# Patient Record
Sex: FEMALE | Race: WHITE
[De-identification: names, ages, dates, MRNs, and addresses within clinical notes are randomized per-mention and may not be internally consistent; named-entity substitution may affect disease eponyms.]

---
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
## 2022-06-02 ENCOUNTER — HOSPITAL ENCOUNTER (EMERGENCY)
Facility: MEDICAL CENTER | Age: 22
End: 2022-06-02
Attending: EMERGENCY MEDICINE
Payer: MEDICAID

## 2022-06-02 VITALS
DIASTOLIC BLOOD PRESSURE: 63 MMHG | BODY MASS INDEX: 27.66 KG/M2 | OXYGEN SATURATION: 100 % | TEMPERATURE: 98.9 F | HEIGHT: 64 IN | SYSTOLIC BLOOD PRESSURE: 127 MMHG | HEART RATE: 59 BPM | WEIGHT: 162.04 LBS | RESPIRATION RATE: 18 BRPM

## 2022-06-02 DIAGNOSIS — N39.0 ACUTE UTI: ICD-10-CM

## 2022-06-02 LAB
APPEARANCE UR: CLEAR
BACTERIA #/AREA URNS HPF: ABNORMAL /HPF
BILIRUB UR QL STRIP.AUTO: NEGATIVE
COLOR UR: YELLOW
EPI CELLS #/AREA URNS HPF: ABNORMAL /HPF
GLUCOSE UR STRIP.AUTO-MCNC: NEGATIVE MG/DL
HCG UR QL: NEGATIVE
KETONES UR STRIP.AUTO-MCNC: ABNORMAL MG/DL
LEUKOCYTE ESTERASE UR QL STRIP.AUTO: ABNORMAL
MICRO URNS: ABNORMAL
MUCOUS THREADS #/AREA URNS HPF: ABNORMAL /HPF
NITRITE UR QL STRIP.AUTO: POSITIVE
PH UR STRIP.AUTO: 6 [PH] (ref 5–8)
PROT UR QL STRIP: NEGATIVE MG/DL
RBC # URNS HPF: ABNORMAL /HPF
RBC UR QL AUTO: ABNORMAL
SP GR UR STRIP.AUTO: >=1.03
WBC #/AREA URNS HPF: ABNORMAL /HPF

## 2022-06-02 PROCEDURE — 87077 CULTURE AEROBIC IDENTIFY: CPT

## 2022-06-02 PROCEDURE — 81025 URINE PREGNANCY TEST: CPT

## 2022-06-02 PROCEDURE — 87086 URINE CULTURE/COLONY COUNT: CPT

## 2022-06-02 PROCEDURE — 87186 SC STD MICRODIL/AGAR DIL: CPT

## 2022-06-02 PROCEDURE — 99283 EMERGENCY DEPT VISIT LOW MDM: CPT

## 2022-06-02 PROCEDURE — 81001 URINALYSIS AUTO W/SCOPE: CPT

## 2022-06-02 RX ORDER — NITROFURANTOIN 25; 75 MG/1; MG/1
100 CAPSULE ORAL 2 TIMES DAILY
Qty: 14 CAPSULE | Refills: 0 | Status: SHIPPED | OUTPATIENT
Start: 2022-06-02 | End: 2022-06-09

## 2022-06-02 RX ORDER — PHENAZOPYRIDINE HYDROCHLORIDE 200 MG/1
200 TABLET, FILM COATED ORAL 3 TIMES DAILY PRN
Qty: 6 TABLET | Refills: 0 | Status: SHIPPED | OUTPATIENT
Start: 2022-06-02 | End: 2023-04-29

## 2022-06-02 NOTE — DISCHARGE INSTRUCTIONS
Take cranberry pills daily as a preventative and continue with follow up with your primary care and obgyn.

## 2022-06-02 NOTE — ED NOTES
Pt cleared for d/c  dischg instructions given to pt  Verbally understands  Aware that Rx's Macrobid and pyridium were sent to listed pharmacy   Pt instructed to  and take as directed  D/c'ed to home in NAD

## 2022-06-02 NOTE — ED PROVIDER NOTES
ED Provider Note    Scribed for Stacey Brandt M.D. by Shayla Dave. 6/2/2022  1:20 PM    Primary care provider: Amrit Jimenez M.D.  Means of arrival: Walk-in  History obtained from: Patient  History limited by: None noted    CHIEF COMPLAINT  Chief Complaint   Patient presents with    Painful Urination       HPI  Jayda Engel is a 21 y.o. female who presents to the Emergency Department with intermittent dysuria for the last couple months. Patient states she has been seen by her primary care physician an was told to complete a kidney ultrasound out patient. She notes that she has been taking antibiotics with little relief of her symptoms. She endorses associated lower abdominal pain, but denies any fevers, back pain, vomiting or vaginal discharge. The patient is currently on her menstrual period. She denies any history of diabetes. The patient reports testing positive for COVID last year.     REVIEW OF SYSTEMS  HEENT:  No ear pain, congestion, or sore throat   EYES: no discharge, redness, or vision changes  CARDIAC: no chest pain, no palpitations    PULMONARY: no dyspnea, cough, or congestion   GI: lower abdominal pain no vomiting or diarrhea  : dysuria no back pain,  vaginal discharge, or hematuria   Neuro: no weakness, numbness, aphasia, or headache  Musculoskeletal: no swelling, deformity, pain, or joint swelling  Endocrine: no fevers, sweating, or weight loss   SKIN: no rash, erythema, or contusions     See history of present illness. All other systems are negative. C.    PAST MEDICAL HISTORY   None noted    SURGICAL HISTORY  patient denies any surgical history    SOCIAL HISTORY    None noted    FAMILY HISTORY  None noted    CURRENT MEDICATIONS  Home Medications       Reviewed by Tara Hameed R.N. (Registered Nurse) on 06/02/22 at 1332  Med List Status: Not Addressed     Medication Last Dose Status        Patient Abel Taking any Medications                           ALLERGIES  No Known  "Allergies    PHYSICAL EXAM  VITAL SIGNS: /69   Pulse 78   Temp 36.5 °C (97.7 °F) (Temporal)   Resp 18   Ht 1.626 m (5' 4\")   Wt 73.5 kg (162 lb 0.6 oz)   SpO2 98%   BMI 27.81 kg/m²     Constitutional: Well developed, Well nourished, No acute distress, Non-toxic appearance.   HEENT: Normocephalic, Atraumatic,  external ears normal, pharynx pink,  Mucous  Membranes moist, No rhinorrhea or mucosal edema  Eyes: PERRL, EOMI, Conjunctiva normal, No discharge.   Neck: Normal range of motion, No tenderness, Supple, No stridor.   Lymphatic: No lymphadenopathy    Cardiovascular: Regular Rate and Rhythm, No murmurs,  rubs, or gallops.   Thorax & Lungs: Lungs clear to auscultation bilaterally, No respiratory distress, No wheezes, rhales or rhonchi, No chest wall tenderness.   Abdomen: Bowel sounds normal, Soft, Mild suprapubic tenderness, non distended,  No pulsatile masses., no rebound guarding or peritoneal signs.   Skin: Warm, Dry, No erythema, No rash,   Back:  No CVA tenderness,  No spinal tenderness, bony crepitance, step offs, or instability.   Neurologic: Alert & oriented clear speech no focal deficits  Extremities: Equal, intact distal pulses, No cyanosis, clubbing or edema,  No tenderness.   Musculoskeletal: Good range of motion in all major joints. No tenderness to palpation or major deformities noted.       DIAGNOSTIC STUDIES / PROCEDURES    LABS  Results for orders placed or performed during the hospital encounter of 06/02/22   URINALYSIS,CULTURE IF INDICATED    Specimen: Urine, Clean Catch   Result Value Ref Range    Color Yellow     Character Clear     Specific Gravity >=1.030 <1.035    Ph 6.0 5.0 - 8.0    Glucose Negative Negative mg/dL    Ketones Trace (A) Negative mg/dL    Protein Negative Negative mg/dL    Bilirubin Negative Negative    Nitrite Positive (A) Negative    Leukocyte Esterase Small (A) Negative    Occult Blood Trace (A) Negative    Micro Urine Req Microscopic    BETA-HCG QUALITATIVE " URINE   Result Value Ref Range    Beta-Hcg Urine Negative Negative   URINE MICROSCOPIC (W/UA)   Result Value Ref Range    WBC 20-50 (A) /hpf    RBC 2-5 (A) /hpf    Bacteria Moderate (A) None /hpf    Epithelial Cells Rare Few /hpf    Mucous Threads Moderate /hpf   URINE CULTURE(NEW)    Specimen: Urine   Result Value Ref Range    Significant Indicator NEG     Source UR     Site -     Culture Result -      All labs reviewed by me.    COURSE & MEDICAL DECISION MAKING  Nursing notes, VS, PMSFHx reviewed in chart.    1:20 PM Patient seen and examined at bedside. Ordered Urine Microscopic, Urinalysis Culture, Beta-HCG Qualitative Urine and Urine Culture to evaluate her symptoms. The patient was encouraged to take Tylenol as needed for pain. The differential diagnoses include but are not limited to: UTI. Additionally, I encouraged the patient to follow up outpatient regarding her urinary symptoms. She was informed of the plan for discharge home with PYRIDIUM and MACROBID. She was advised to return for any high fevers, back pain, vomiting or worsening symptoms. Patient verbalizes understanding and agreement to this plan of care.     The patient will return for new or worsening symptoms and is stable at the time of discharge.    The patient is referred to a primary physician for blood pressure management, diabetic screening, and for all other preventative health concerns.    DISPOSITION:  Patient will be discharged home in stable condition.    FOLLOW UP:  Amrit Jimenez M.D.  [address]  [address]  [facility] [address]  [phone]    Call in 1 day  for recheck      OUTPATIENT MEDICATIONS:  Discharge Medication List as of 6/2/2022  1:40 PM        START taking these medications    Details   nitrofurantoin (MACROBID) 100 MG Cap Take 1 Capsule by mouth 2 times a day for 7 days., Disp-14 Capsule, R-0, Normal      phenazopyridine (PYRIDIUM) 200 MG Tab Take 1 Tablet by mouth 3 times a day as needed for Mild Pain., Disp-6 Tablet,  R-0, Normal             FINAL IMPRESSION  1. Acute UTI          Shayla RASHEED (Scribe), am scribing for, and in the presence of, Stacey Brandt M.D..    Electronically signed by: Shayla Dave (Scribe), 6/2/2022    Stacey RASHEED M.D. personally performed the services described in this documentation, as scribed by Shayla Dave in my presence, and it is both accurate and complete.    The note accurately reflects work and decisions made by me.  Stacey Brandt M.D.  6/2/2022  3:46 PM

## 2022-06-02 NOTE — ED TRIAGE NOTES
Pt amb to triage c/o painful urination >1mo; pt states pain to lower pelvis region, worsens with urination.

## 2022-06-02 NOTE — LETTER
6/4/2022               [place] [address]        Dear Jayda (MR#[id number])    This letter is sent in regards to your recent visit to the [facility] Emergency Department on 6/2/2022. During the visit, tests were performed to assist the physician in your medical diagnosis. A review of your tests requires that we notify you of the following:    Your urine culture was POSITIVE for a bacteria called Escherichia coli. The antibiotic prescribed for you (nitrofurantoin) should be active to treat this bacteria. However, you may STOP taking your antibiotic after 5 days as 5 days is sufficient to treat your infection.     Please feel free to contact me at the number below if you have any questions or concerns. Thank you for your cooperation in the matter.    Sincerely,  ED Culture Follow-Up Staff  Shirley Laguerre, PharmD,BCPS  PGY2 Infectious Diseases Pharmacy Resident      [facility] Emergency Department  [address] [place] [id number] [phone] (Shirley's Phone Number)  [phone] (ED Culture Line)

## 2022-06-04 LAB
BACTERIA UR CULT: ABNORMAL
SIGNIFICANT IND 70042: ABNORMAL
SITE SITE: ABNORMAL
SOURCE SOURCE: ABNORMAL

## 2022-06-04 NOTE — ED NOTES
"ED Positive Culture Follow-up/Notification Note:    Date: 6/4/2022     Patient seen in the ED on 6/2/2022 for dysuria.   1. Acute UTI       Discharge Medication List as of 6/2/2022  1:40 PM      START taking these medications    Details   nitrofurantoin (MACROBID) 100 MG Cap Take 1 Capsule by mouth 2 times a day for 7 days., Disp-14 Capsule, R-0, Normal      phenazopyridine (PYRIDIUM) 200 MG Tab Take 1 Tablet by mouth 3 times a day as needed for Mild Pain., Disp-6 Tablet, R-0, Normal             Allergies: Patient has no known allergies.     Vitals:    06/02/22 1154 06/02/22 1339   BP: 122/69 127/63   Pulse: 78 (!) 59   Resp: 18 18   Temp: 36.5 °C (97.7 °F) 37.2 °C (98.9 °F)   TempSrc: Temporal Temporal   SpO2: 98% 100%   Weight: 73.5 kg (162 lb 0.6 oz)    Height: 1.626 m (5' 4\")        Final cultures:   Results     Procedure Component Value Units Date/Time    URINE CULTURE(NEW) [[id number]]  (Abnormal)  (Susceptibility) Collected: 06/02/22 1225    Order Status: Completed Specimen: Urine Updated: 06/04/22 0848     Significant Indicator POS     Source UR     Site -     Culture Result -      Escherichia coli  >100,000 cfu/mL  Presumptive identification        Streptococcus agalactiae (Group B)  10-50,000 cfu/mL      Narrative:      Indication for culture:->Patient WITHOUT an indwelling Baltazar  catheter in place with new onset of Dysuria, Frequency,  Urgency, and/or Suprapubic pain    Susceptibility     Escherichia coli (1)     Antibiotic Interpretation Microscan   Method Status    Amikacin  [*]  Sensitive <=16 mcg/mL GAYATRI Final    Ampicillin Resistant >16 mcg/mL GAYATRI Final    Amoxicillin/CA  [*]  Resistant >16/8 mcg/mL GAYATRI Final    Aztreonam  [*]  Sensitive <=4 mcg/mL GAYATRI Final    Ceftriaxone Sensitive <=1 mcg/mL GAYATRI Final    Ceftazidime  [*]  Sensitive <=1 mcg/mL GAYATRI Final    Cefazolin Sensitive <=2 mcg/mL GAYATRI Final     Breakpoints when Cefazolin is used for therapy of infections  other than uncomplicated UTIs due to " Enterobacterales are as  follows:  GAYATRI and Interpretation:  <=2 S  4 I  >=8 R         Ciprofloxacin Sensitive <=0.25 mcg/mL GAYATRI Final    Cefepime Sensitive <=2 mcg/mL GAYATRI Final    Cefuroxime Sensitive <=4 mcg/mL GAYATRI Final    Ampicillin/sulbactam Resistant >16/8 mcg/mL GAYATRI Final    Ertapenem  [*]  Sensitive <=0.5 mcg/mL GAYATRI Final    Tobramycin Sensitive <=2 mcg/mL GAYATRI Final    Nitrofurantoin Sensitive <=32 mcg/mL GAYATRI Final    Gentamicin Sensitive <=2 mcg/mL GAYATRI Final    Levofloxacin Sensitive <=0.5 mcg/mL GAYATRI Final    Meropenem  [*]  Sensitive <=1 mcg/mL GAYATRI Final    Minocycline Sensitive <=4 mcg/mL GAYATRI Final    Pip/Tazobactam Sensitive <=8 mcg/mL GAYATRI Final    Trimeth/Sulfa Resistant >2/38 mcg/mL GAYATRI Final    Tetracycline  [*]  Sensitive <=4 mcg/mL GAYATRI Final    Tigecycline Sensitive <=2 mcg/mL GAYATRI Final           [*]  Suppressed Antibiotic                 URINALYSIS,CULTURE IF INDICATED [[id number]]  (Abnormal) Collected: 06/02/22 1225    Order Status: Completed Specimen: Urine, Clean Catch Updated: 06/02/22 1311     Color Yellow     Character Clear     Specific Gravity >=1.030     Ph 6.0     Glucose Negative mg/dL      Ketones Trace mg/dL      Protein Negative mg/dL      Bilirubin Negative     Nitrite Positive     Leukocyte Esterase Small     Occult Blood Trace     Micro Urine Req Microscopic    Narrative:      Indication for culture:->Patient WITHOUT an indwelling Baltazar  catheter in place with new onset of Dysuria, Frequency,  Urgency, and/or Suprapubic pain    URINALYSIS CULTURE, IF INDICATED [[id number]]     Order Status: Canceled Specimen: Urine           Plan:   Appropriate antibiotic therapy prescribed. GBS in urine likely colonizer since part of the normal vaginal carmen. Suspect that ecoli is the urinary pathogen. However, patient only needs to take 5 days of macrobid for acute cystitis instead of the 7 days as prescribed. Attempted to call patient to discuss symptoms and cultures, cellphone mailbox was full  and home phone was not in service.  Sent letter to patient to notify of positive culture result and encourage compliance with prescribed antibiotics. Also noted to stop antibiotics after 5 days of therapy.    Shirley Laguerre, PharmD, BCPS   PGY2 Infectious Diseases Pharmacy Resident

## 2023-03-31 ENCOUNTER — APPOINTMENT (OUTPATIENT)
Dept: RADIOLOGY | Facility: MEDICAL CENTER | Age: 23
End: 2023-03-31
Attending: EMERGENCY MEDICINE
Payer: MEDICAID

## 2023-03-31 ENCOUNTER — HOSPITAL ENCOUNTER (EMERGENCY)
Facility: MEDICAL CENTER | Age: 23
End: 2023-03-31
Attending: EMERGENCY MEDICINE
Payer: MEDICAID

## 2023-03-31 VITALS
WEIGHT: 165.57 LBS | SYSTOLIC BLOOD PRESSURE: 118 MMHG | HEART RATE: 71 BPM | BODY MASS INDEX: 28.27 KG/M2 | RESPIRATION RATE: 18 BRPM | DIASTOLIC BLOOD PRESSURE: 72 MMHG | TEMPERATURE: 98.2 F | HEIGHT: 64 IN | OXYGEN SATURATION: 96 %

## 2023-03-31 DIAGNOSIS — O46.8X1 SUBCHORIONIC HEMATOMA IN FIRST TRIMESTER, SINGLE OR UNSPECIFIED FETUS: ICD-10-CM

## 2023-03-31 DIAGNOSIS — R10.9 ABDOMINAL PAIN DURING PREGNANCY IN FIRST TRIMESTER: ICD-10-CM

## 2023-03-31 DIAGNOSIS — O26.891 ABDOMINAL PAIN DURING PREGNANCY IN FIRST TRIMESTER: ICD-10-CM

## 2023-03-31 DIAGNOSIS — O41.8X10 SUBCHORIONIC HEMATOMA IN FIRST TRIMESTER, SINGLE OR UNSPECIFIED FETUS: ICD-10-CM

## 2023-03-31 LAB
ALBUMIN SERPL BCP-MCNC: 4.1 G/DL (ref 3.2–4.9)
ALBUMIN/GLOB SERPL: 1.6 G/DL
ALP SERPL-CCNC: 55 U/L (ref 30–99)
ALT SERPL-CCNC: 9 U/L (ref 2–50)
ANION GAP SERPL CALC-SCNC: 11 MMOL/L (ref 7–16)
APPEARANCE UR: CLEAR
AST SERPL-CCNC: 13 U/L (ref 12–45)
B-HCG SERPL-ACNC: ABNORMAL MIU/ML (ref 0–10)
BASOPHILS # BLD AUTO: 0.2 % (ref 0–1.8)
BASOPHILS # BLD: 0.02 K/UL (ref 0–0.12)
BILIRUB SERPL-MCNC: 0.2 MG/DL (ref 0.1–1.5)
BILIRUB UR QL STRIP.AUTO: NEGATIVE
BUN SERPL-MCNC: 11 MG/DL (ref 8–22)
CALCIUM ALBUM COR SERPL-MCNC: 9.1 MG/DL (ref 8.5–10.5)
CALCIUM SERPL-MCNC: 9.2 MG/DL (ref 8.4–10.2)
CHLORIDE SERPL-SCNC: 103 MMOL/L (ref 96–112)
CO2 SERPL-SCNC: 23 MMOL/L (ref 20–33)
COLOR UR: YELLOW
CREAT SERPL-MCNC: 0.64 MG/DL (ref 0.5–1.4)
EOSINOPHIL # BLD AUTO: 0.1 K/UL (ref 0–0.51)
EOSINOPHIL NFR BLD: 1.1 % (ref 0–6.9)
ERYTHROCYTE [DISTWIDTH] IN BLOOD BY AUTOMATED COUNT: 39.8 FL (ref 35.9–50)
GFR SERPLBLD CREATININE-BSD FMLA CKD-EPI: 129 ML/MIN/1.73 M 2
GLOBULIN SER CALC-MCNC: 2.5 G/DL (ref 1.9–3.5)
GLUCOSE SERPL-MCNC: 92 MG/DL (ref 65–99)
GLUCOSE UR STRIP.AUTO-MCNC: NEGATIVE MG/DL
HCT VFR BLD AUTO: 36.1 % (ref 37–47)
HGB BLD-MCNC: 12.3 G/DL (ref 12–16)
IMM GRANULOCYTES # BLD AUTO: 0.02 K/UL (ref 0–0.11)
IMM GRANULOCYTES NFR BLD AUTO: 0.2 % (ref 0–0.9)
KETONES UR STRIP.AUTO-MCNC: ABNORMAL MG/DL
LEUKOCYTE ESTERASE UR QL STRIP.AUTO: NEGATIVE
LIPASE SERPL-CCNC: 31 U/L (ref 7–58)
LYMPHOCYTES # BLD AUTO: 2.78 K/UL (ref 1–4.8)
LYMPHOCYTES NFR BLD: 29.7 % (ref 22–41)
MCH RBC QN AUTO: 30.3 PG (ref 27–33)
MCHC RBC AUTO-ENTMCNC: 34.1 G/DL (ref 33.6–35)
MCV RBC AUTO: 88.9 FL (ref 81.4–97.8)
MICRO URNS: ABNORMAL
MONOCYTES # BLD AUTO: 0.55 K/UL (ref 0–0.85)
MONOCYTES NFR BLD AUTO: 5.9 % (ref 0–13.4)
NEUTROPHILS # BLD AUTO: 5.9 K/UL (ref 2–7.15)
NEUTROPHILS NFR BLD: 62.9 % (ref 44–72)
NITRITE UR QL STRIP.AUTO: NEGATIVE
NRBC # BLD AUTO: 0 K/UL
NRBC BLD-RTO: 0 /100 WBC
NUMBER OF RH DOSES IND 8505RD: NORMAL
PH UR STRIP.AUTO: 6 [PH] (ref 5–8)
PLATELET # BLD AUTO: 288 K/UL (ref 164–446)
PMV BLD AUTO: 9.5 FL (ref 9–12.9)
POTASSIUM SERPL-SCNC: 3.8 MMOL/L (ref 3.6–5.5)
PROT SERPL-MCNC: 6.6 G/DL (ref 6–8.2)
PROT UR QL STRIP: NEGATIVE MG/DL
RBC # BLD AUTO: 4.06 M/UL (ref 4.2–5.4)
RBC UR QL AUTO: NEGATIVE
RH BLD: NORMAL
SODIUM SERPL-SCNC: 137 MMOL/L (ref 135–145)
SP GR UR STRIP.AUTO: >=1.03
WBC # BLD AUTO: 9.4 K/UL (ref 4.8–10.8)

## 2023-03-31 PROCEDURE — 76801 OB US < 14 WKS SINGLE FETUS: CPT

## 2023-03-31 PROCEDURE — 36415 COLL VENOUS BLD VENIPUNCTURE: CPT

## 2023-03-31 PROCEDURE — 81003 URINALYSIS AUTO W/O SCOPE: CPT

## 2023-03-31 PROCEDURE — 83690 ASSAY OF LIPASE: CPT

## 2023-03-31 PROCEDURE — 86901 BLOOD TYPING SEROLOGIC RH(D): CPT

## 2023-03-31 PROCEDURE — 99284 EMERGENCY DEPT VISIT MOD MDM: CPT

## 2023-03-31 PROCEDURE — 85025 COMPLETE CBC W/AUTO DIFF WBC: CPT

## 2023-03-31 PROCEDURE — 84702 CHORIONIC GONADOTROPIN TEST: CPT

## 2023-03-31 PROCEDURE — 80053 COMPREHEN METABOLIC PANEL: CPT

## 2023-03-31 ASSESSMENT — PAIN DESCRIPTION - PAIN TYPE: TYPE: ACUTE PAIN

## 2023-03-31 NOTE — Clinical Note
Jayda Engel was seen and treated in our emergency department on 3/31/2023.  She may return to work on 04/01/2023.  Recommend light duty, no lifting over 10 pounds for the next 3 weeks     If you have any questions or concerns, please don't hesitate to call.      Crispin Cole M.D.

## 2023-04-01 NOTE — ED NOTES
Pt is 5 weeks pregnant. Reports 7/10 pain in the lower right of her abdomen. Pt reports they have seen their OBGYN and confirmed the pregnancy was in the uterus.  Pt states they have had this pain previous to pregnancy without being able to identify the cause.  Pushing and pulling seem to make the pain worse according to pt.  Pt CC is lower right abdominal pain. Pt stated they mostly want to make sure everything is ok with the pregnancy.  Aox4, pt ambulated to the room, speaking in full sentences.

## 2023-04-01 NOTE — ED PROVIDER NOTES
"ED Provider Note    Primary care provider: Amrit Jimenez M.D.    CHIEF COMPLAINT  Chief Complaint   Patient presents with    Abdominal Pain     Low abd pain for 2 days, she is 5 weeks pregnant and was advised by PCP to come to the ED. She was recently treated for a UTI and is still on the antibiotics.       HPI  Jayda Engel is a 22 y.o. female G1, P0 EGA 5 weeks by US who presents to the Emergency Department suprapubic/right-sided abdominal pain.  The patient states she has had intermittent pain in this location preceding her pregnancy.  Over the past day or so it has become more persistent, no modifying factors.  She had an ultrasound in the OB clinic 2 weeks ago that did visualize an intrauterine gestational sac.  She called them as she was continued to have some pain in this location and they recommend she come to the ER to be checked out.    She denies any fevers, chills, nausea, vomiting, vaginal discharge.  She did have a UTI at her last OB appointment.  No prior history of abdominal surgeries.  REVIEW OF SYSTEMS  See HPI.     PAST MEDICAL HISTORY       SURGICAL HISTORY  patient denies any surgical history    SOCIAL HISTORY      Social History     Substance and Sexual Activity   Drug Use Not on file       FAMILY HISTORY  No family history on file.    CURRENT MEDICATIONS  Reviewed.  See Encounter Summary.     ALLERGIES  No Known Allergies    PHYSICAL EXAM  VITAL SIGNS: /72   Pulse 71   Temp 36.8 °C (98.2 °F) (Temporal)   Resp 18   Ht 1.626 m (5' 4\")   Wt 75.1 kg (165 lb 9.1 oz)   LMP 02/11/2023 (Exact Date)   SpO2 96%   BMI 28.42 kg/m²   Constitutional: Awake, alert in no apparent distress.  HENT: Normocephalic, Bilateral external ears normal. Nose normal.   Eyes: Conjunctiva normal, non-icteric, EOMI.    Thorax & Lungs: Easy unlabored respirations, Clear to ascultation bilaterally.  Cardiovascular: Regular rate, Regular rhythm, No murmurs, rubs or gallops. Bilateral pulses symmetrical. "   Abdomen:  Soft, nontender, nondistended, normal active bowel sounds.  Negative psoas, negative obturator.  :    Skin: Visualized skin is  Dry, No erythema, No rash.   Musculoskeletal:   No cyanosis, clubbing or edema. No leg asymmetry.   Neurologic: Alert, Grossly non-focal.   Psychiatric: Normal affect, Normal mood  Lymphatic:      RADIOLOGY  US-OB 1ST TRIMESTER WITH TRANSVAGINAL (COMBO)   Final Result      1.  Single living intrauterine pregnancy at 6 weeks, 3 days estimated gestational age.      2.  Small subchorionic/implantational hemorrhage measuring 3.7 x 1.1 x 2.4 cm      3.  1.5 cm right adnexal cyst which is probably a corpus luteum cyst               COURSE & MEDICAL DECISION MAKING  Pertinent Labs & Imaging studies reviewed. (See chart for details)    Differential diagnoses include but are not limited to: Ectopic pregnancy, corpus luteal cyst, UTI, abdominal pain NOS, do not suspect appendicitis at this time.    5:46 PM - Nursing notes reviewed, patient seen and examined at bedside.    Decision Making:  This is a pleasant 22 y.o. year old female who presents with right lower quadrant pain, the pain is not over McBurney's point, its more in the pubic region, just right of midline.  Presentation today is most consistent with a corpus luteal cyst.  Interestingly, the patient has had intermittent pain there even prior to pregnancy, etiology of that type of pain not entirely clear, as possibly she was having intermittent ovarian cyst causing similar type of pain.  She is Rh+, no indication for RhoGAM.  She does have a viable IUP seen on transvaginal ultrasound today.  She also has a small subchorionic hemorrhage.    Remainder of labs reassuring, no leukocytosis or leftward shift.,  Do not suspect acute appendicitis today.    At this time reassurance provided, recommend pelvic rest, Tylenol as needed for any aches and pains.  She should return for soaking more than 4 pads in 4 hours, severe abdominal pain,  intractable vomiting or any other concern.        Discharge Medications:  Discharge Medication List as of 3/31/2023  7:42 PM          The patient was discharged home (see d/c instructions) was told to return immediately for any signs or symptoms listed, or any worsening at all.  The patient verbally agreed to the discharge precautions and follow-up plan which is documented in EPIC.        FINAL IMPRESSION  1. Abdominal pain during pregnancy in first trimester    2. Subchorionic hematoma in first trimester, single or unspecified fetus

## 2023-04-01 NOTE — ED NOTES
Pt. Verbalizes understanding of discharge instructions. Accompanied to lobby with sibling. Pt. Alert/awake in NAD.   All questions answered and understood. Given report as well for work.

## 2023-04-01 NOTE — ED TRIAGE NOTES
Chief Complaint   Patient presents with    Abdominal Pain     Low abd pain for 2 days, she is 5 weeks pregnant and was advised by PCP to come to the ED. She was recently treated for a UTI and is still on the antibiotics.    1st pregnancy

## 2023-04-29 ENCOUNTER — HOSPITAL ENCOUNTER (EMERGENCY)
Facility: MEDICAL CENTER | Age: 23
End: 2023-04-29
Attending: EMERGENCY MEDICINE
Payer: MEDICAID

## 2023-04-29 ENCOUNTER — APPOINTMENT (OUTPATIENT)
Dept: RADIOLOGY | Facility: MEDICAL CENTER | Age: 23
End: 2023-04-29
Attending: EMERGENCY MEDICINE
Payer: MEDICAID

## 2023-04-29 VITALS
OXYGEN SATURATION: 98 % | HEART RATE: 77 BPM | RESPIRATION RATE: 18 BRPM | SYSTOLIC BLOOD PRESSURE: 121 MMHG | BODY MASS INDEX: 28.38 KG/M2 | DIASTOLIC BLOOD PRESSURE: 63 MMHG | TEMPERATURE: 98.2 F | WEIGHT: 165.34 LBS

## 2023-04-29 DIAGNOSIS — O20.0 THREATENED MISCARRIAGE: ICD-10-CM

## 2023-04-29 DIAGNOSIS — N39.0 ACUTE UTI: ICD-10-CM

## 2023-04-29 LAB
APPEARANCE UR: ABNORMAL
B-HCG SERPL-ACNC: ABNORMAL MIU/ML (ref 0–10)
BACTERIA #/AREA URNS HPF: ABNORMAL /HPF
BILIRUB UR QL STRIP.AUTO: NEGATIVE
COLOR UR: YELLOW
EPI CELLS #/AREA URNS HPF: ABNORMAL /HPF
GLUCOSE UR STRIP.AUTO-MCNC: NEGATIVE MG/DL
KETONES UR STRIP.AUTO-MCNC: ABNORMAL MG/DL
LEUKOCYTE ESTERASE UR QL STRIP.AUTO: NEGATIVE
MICRO URNS: ABNORMAL
MUCOUS THREADS #/AREA URNS HPF: ABNORMAL /HPF
NITRITE UR QL STRIP.AUTO: NEGATIVE
PH UR STRIP.AUTO: 6 [PH] (ref 5–8)
PROT UR QL STRIP: NEGATIVE MG/DL
RBC # URNS HPF: ABNORMAL /HPF
RBC UR QL AUTO: ABNORMAL
SP GR UR STRIP.AUTO: 1.02
UNIDENT CRYS URNS QL MICRO: ABNORMAL /HPF
WBC #/AREA URNS HPF: ABNORMAL /HPF

## 2023-04-29 PROCEDURE — 36415 COLL VENOUS BLD VENIPUNCTURE: CPT

## 2023-04-29 PROCEDURE — 84702 CHORIONIC GONADOTROPIN TEST: CPT

## 2023-04-29 PROCEDURE — 700102 HCHG RX REV CODE 250 W/ 637 OVERRIDE(OP): Performed by: EMERGENCY MEDICINE

## 2023-04-29 PROCEDURE — 81001 URINALYSIS AUTO W/SCOPE: CPT

## 2023-04-29 PROCEDURE — 99284 EMERGENCY DEPT VISIT MOD MDM: CPT

## 2023-04-29 PROCEDURE — 76801 OB US < 14 WKS SINGLE FETUS: CPT

## 2023-04-29 PROCEDURE — A9270 NON-COVERED ITEM OR SERVICE: HCPCS | Performed by: EMERGENCY MEDICINE

## 2023-04-29 RX ORDER — CEPHALEXIN 250 MG/1
500 CAPSULE ORAL ONCE
Status: COMPLETED | OUTPATIENT
Start: 2023-04-29 | End: 2023-04-29

## 2023-04-29 RX ORDER — AMOXICILLIN 500 MG/1
500 CAPSULE ORAL 2 TIMES DAILY
COMMUNITY

## 2023-04-29 RX ORDER — CEPHALEXIN 500 MG/1
500 CAPSULE ORAL 3 TIMES DAILY
Qty: 21 CAPSULE | Refills: 0 | Status: SHIPPED | OUTPATIENT
Start: 2023-04-29 | End: 2023-05-06

## 2023-04-29 RX ADMIN — CEPHALEXIN 500 MG: 250 CAPSULE ORAL at 18:55

## 2023-04-29 ASSESSMENT — FIBROSIS 4 INDEX: FIB4 SCORE: 0.3

## 2023-04-29 NOTE — ED NOTES
Med rec completed per patient at bedside, with RX bottle for amoxicillin provided by patient (reviewed and returned).  Allergies reviewed with patient.  Patient's preferred pharmacy: [facility] on [address].    Patient is on a 7 day course of amoxicillin prescribed on 4/21/2023. Patient states 4 doses remaining in course.  Patient is on a 7 day course of Monistat-7 which she states that she started on 4/23/2023. Patient states 1 dose remaining in course.

## 2023-04-29 NOTE — ED PROVIDER NOTES
ED Provider Note    CHIEF COMPLAINT  Chief Complaint   Patient presents with    Vaginal Bleeding     Pt is 11 weeks pregnant, noted bleeding since am. No cramping just discomfort.       EXTERNAL RECORDS REVIEWED  Outpatient Notes      HPI/ROS  LIMITATION TO HISTORY   Select: : None  OUTSIDE HISTORIAN(S):  Mother at bedside    Jayda Engel is a 22 y.o. G1, P0 at 11 weeks female who presents to the emergency department for vaginal bleeding.  Patient reportedly 11 weeks pregnant and previous subchorionic hematoma and reports that she had very slight bleeding today.  She said that it was more than just spotting but not heavy flow she has had some minimal discomfort in her pelvis but does not describe it as pain she is also had some minimal discomfort with urination no fevers no chills no bowel irregularities no other acute symptom change or concern.  Rh+.    PAST MEDICAL HISTORY   has a past medical history of Patient denies medical problems.    SURGICAL HISTORY  patient denies any surgical history    FAMILY HISTORY  History reviewed. No pertinent family history.    SOCIAL HISTORY  Social History     Tobacco Use    Smoking status: Never    Smokeless tobacco: Never   Substance and Sexual Activity    Alcohol use: Never    Drug use: Never    Sexual activity: Not on file       CURRENT MEDICATIONS  Home Medications    **Home medications have not yet been reviewed for this encounter**         ALLERGIES  No Known Allergies    PHYSICAL EXAM  VITAL SIGNS: /70   Pulse 78   Temp 36.8 °C (98.2 °F) (Temporal)   Resp 18   Wt 75 kg (165 lb 5.5 oz)   LMP 02/11/2023 (Exact Date)   SpO2 99%   BMI 28.38 kg/m²    Pulse ox interpretation: I interpret this pulse ox as normal.  Constitutional: Alert and oriented x 3, minimal distress  HEENT: Atraumatic normocephalic, pupils are equal round reactive to light extraocular movements are intact. The nares is clear, external ears are normal, mouth shows moist mucous membranes normal  dentition for age  Neck: Supple, no JVD no tracheal deviation  Cardiovascular: Regular rate and rhythm no murmur rub or gallop 2+ pulses peripherally x4  Thorax & Lungs: No respiratory distress, no wheezes rales or rhonchi, No chest tenderness.   GI: Soft nontender nondistended positive bowel sounds, no peritoneal signs  Skin: Warm dry no acute rash or lesion  Musculoskeletal: Moving all extremities with full range and 5 of 5 strength no acute  deformity  Neurologic: Cranial nerves III through XII are grossly intact no sensory deficit no cerebellar dysfunction   Psychiatric: Appropriate affect for situation at this time            DIAGNOSTIC STUDIES / PROCEDURES  Results for orders placed or performed during the hospital encounter of 23   BETA-HCG QUANTITATIVE SERUM   Result Value Ref Range    Bhcg 70173.0 (H) 0.0 - 10.0 mIU/mL   URINALYSIS,CULTURE IF INDICATED    Specimen: Urine   Result Value Ref Range    Color Yellow     Character Cloudy (A)     Specific Gravity 1.020 <1.035    Ph 6.0 5.0 - 8.0    Glucose Negative Negative mg/dL    Ketones Trace (A) Negative mg/dL    Protein Negative Negative mg/dL    Bilirubin Negative Negative    Nitrite Negative Negative    Leukocyte Esterase Negative Negative    Occult Blood Trace (A) Negative    Micro Urine Req Microscopic    URINE MICROSCOPIC (W/UA)   Result Value Ref Range    WBC 2-5 /hpf    RBC Rare /hpf    Bacteria Many (A) None /hpf    Epithelial Cells Moderate (A) Few /hpf    Mucous Threads Moderate /hpf    Urine Crystals Few Amorphous /hpf         COURSE & MEDICAL DECISION MAKING    ED Observation Status? No; Patient does not meet criteria for ED Observation.     INITIAL ASSESSMENT, COURSE AND PLAN  Care Narrative: Pleasant 22 old female  at 11 weeks previous subchorionic hematoma with some minor bleeding today Rh+, ultrasound shows subchorionic hematoma that is decreasing in size active IUP with appropriate heart rate.  Many bacteria on UA will be placed  on Keflex and instructed to follow-up with gynecology as scheduled return here for further bleeding pain fevers any other acute symptom change or concern otherwise discharged in stable and improved condition.            ADDITIONAL PROBLEM LIST    DISPOSITION AND DISCUSSIONS  I have discussed management of the patient with the following physicians and JEFFERY's:      Discussion of management with other QHP or appropriate source(s): None     Escalation of care considered, and ultimately not performed:  Barriers to care at this time, including but not limited to: .     Decision tools and prescription drugs considered including, but not limited to placed on antibiotics for bacteriuria of pregnancy/possible minimal UTI        FINAL DIAGNOSIS  1. Threatened miscarriage    2. Acute UTI

## 2023-04-30 NOTE — ED NOTES
Erp to bedside to discuss results. D/c instructions reviewed with pt and mom. Aware of need to f/u with ob on Monday,  meds at pharmacy and take as directed as well as take probiotic while taking antibx, return for worsening s/s. Ok for tylenol po and/or hot pack to low back for pain

## 2023-09-16 ENCOUNTER — HOSPITAL ENCOUNTER (EMERGENCY)
Facility: MEDICAL CENTER | Age: 23
End: 2023-09-16
Attending: EMERGENCY MEDICINE
Payer: MEDICAID

## 2023-09-16 VITALS
SYSTOLIC BLOOD PRESSURE: 110 MMHG | HEIGHT: 64 IN | WEIGHT: 170.42 LBS | TEMPERATURE: 97.7 F | HEART RATE: 69 BPM | RESPIRATION RATE: 20 BRPM | DIASTOLIC BLOOD PRESSURE: 68 MMHG | OXYGEN SATURATION: 94 % | BODY MASS INDEX: 29.09 KG/M2

## 2023-09-16 DIAGNOSIS — Z34.93: ICD-10-CM

## 2023-09-16 PROCEDURE — 99284 EMERGENCY DEPT VISIT MOD MDM: CPT

## 2023-09-16 ASSESSMENT — FIBROSIS 4 INDEX: FIB4 SCORE: 0.3

## 2023-09-16 NOTE — ED PROVIDER NOTES
"ED Provider Note    CHIEF COMPLAINT  Chief Complaint   Patient presents with    Other     Pt. Is approx 31 weeks pregnant. Reports noting hiccups and increase in fetal movements since last night. Denies abd pain, vaginal bleeding.       EXTERNAL RECORDS REVIEWED  Other for trimester ultrasound 3/31/2023 with single live intrauterine pregnancy at 6 weeks, 3 days.  Small subchorionic/implantation hemorrhage.  Right adnexal cyst noted as well.  4/29/2023 fetal ultrasound with single live intrauterine pregnancy at 11 weeks, 0 days.  Subacute implantation hemorrhage it decreased in size.    HPI/ROS  LIMITATION TO HISTORY   Select: : None  OUTSIDE HISTORIAN(S):   friend    Jayda Engel is a 22 y.o. female who ambulates to the emergency department through triage for fetal \"hiccups\" and increased movement last night.  Patient denies any abdominal pain or cramping at any time.  Denies vaginal bleeding or other discharge or gushes of fluid.  Denies dysuria, frequency or hematuria.  Patient states symptoms have resolved and baby seems to be moving normally today.    Patient stated 31 weeks pregnant, followed by [facility].  Plans to deliver at [facility].    PAST MEDICAL HISTORY   has a past medical history of Patient denies medical problems.    SURGICAL HISTORY  patient denies any surgical history    FAMILY HISTORY  No family history on file.    SOCIAL HISTORY  Social History     Tobacco Use    Smoking status: Never    Smokeless tobacco: Never   Substance and Sexual Activity    Alcohol use: Never    Drug use: Never    Sexual activity: Not on file       CURRENT MEDICATIONS  Home Medications       Reviewed by Jayshree Hodge R.N. (Registered Nurse) on 09/16/23 at 1143  Med List Status: Not Addressed     Medication Last Dose Status   amoxicillin (AMOXIL) 500 MG Cap  Active   miconazole (MONISTAT 7 SIMPLY CURE) 2 % Cream  Active   Prenatal MV & Min w/FA-DHA (PRENATAL GUMMIES PO)  Active              " "      ALLERGIES  Allergies   Allergen Reactions    Corn-Related Products Unspecified     Patient states allergic per skin test, reaction not verified    Fish Unspecified     Cod - patient states allergic per skin test, reaction not verified       PHYSICAL EXAM  VITAL SIGNS: /72   Pulse 79   Temp 36.5 °C (97.7 °F) (Temporal)   Resp 20   Ht 1.626 m (5' 4\")   Wt 77.3 kg (170 lb 6.7 oz)   LMP 02/11/2023 (Exact Date)   SpO2 96%   BMI 29.25 kg/m²    Pulse ox interpretation: I interpret this pulse ox as normal.  Constitutional: Alert in no apparent distress.  HENT: Normocephalic, atraumatic. Bilateral external ears normal, Nose normal.  Eyes: Conjunctiva normal.   Neck: Normal range of motion  Cardiovascular: Normal peripheral perfusion  Thorax & Lungs: Nonlabored respirations  Abdomen: Soft, non-distended, non-tender to palpation.  Gravid above the umbilicus.  Fetal heart tones 172.  Skin: Warm, Dry  Musculoskeletal: Good range of motion in all major joints.   Neurologic: Alert and oriented 4.  Ambulates independently  Psychiatric: Affect normal, Judgment normal, Mood normal.       COURSE & MEDICAL DECISION MAKING    ED Observation Status? No; Patient does not meet criteria for ED Observation.     INITIAL ASSESSMENT, COURSE AND PLAN  Care Narrative:   Patient arrives for fetal hiccups and increased movement last night, states returned to baseline with appropriate fetal movement today.  Denies pain, bleeding or leakage of fluid.  Fetal heart tones are appropriate in the emergency department.  Hemodynamically stable.  No evidence for active labor, imminent delivery.  Patient has been encouraged to go to [facility] (where she plans to deliver) for further obstetric evaluation given 31-week pregnancy.    ADDITIONAL PROBLEM LIST  Denies  DISPOSITION AND DISCUSSIONS    Escalation of care considered, and ultimately not performed:diagnostic imaging and acute inpatient care management, however at this " time, the patient is most appropriate for outpatient management    The patient is stable for discharge home, anticipatory guidance provided, close follow-up is encouraged and strict return instructions have been discussed. Patient is agreeable to the disposition and plan.      FINAL DIAGNOSIS  1. Movement of fetus present during pregnancy in third trimester           Electronically signed by: Sera Ahn D.O., 9/16/2023 12:54 PM

## 2023-09-16 NOTE — ED NOTES
Assumed care with Toma WHITFIELD. Assessed fetal heart tones. Pt advised by  to follow up with either [facility] or [facility] for further care if desired.

## 2023-09-16 NOTE — DISCHARGE INSTRUCTIONS
Go to [facility] for further obstetric evaluation now, or for any pregnancy related concerns in this third trimester.    GO immediately for cramping or contractions, bleeding, decreased fetal movement, fever or other new concerns.

## 2025-01-10 ENCOUNTER — HOSPITAL ENCOUNTER (EMERGENCY)
Facility: MEDICAL CENTER | Age: 25
End: 2025-01-10
Attending: EMERGENCY MEDICINE
Payer: MEDICAID

## 2025-01-10 VITALS
WEIGHT: 188.18 LBS | SYSTOLIC BLOOD PRESSURE: 119 MMHG | OXYGEN SATURATION: 94 % | HEART RATE: 96 BPM | BODY MASS INDEX: 32.13 KG/M2 | TEMPERATURE: 98.6 F | RESPIRATION RATE: 16 BRPM | HEIGHT: 64 IN | DIASTOLIC BLOOD PRESSURE: 78 MMHG

## 2025-01-10 DIAGNOSIS — R53.1 WEAKNESS: ICD-10-CM

## 2025-01-10 DIAGNOSIS — D72.818 OTHER DECREASED WHITE BLOOD CELL (WBC) COUNT: ICD-10-CM

## 2025-01-10 DIAGNOSIS — R42 DIZZINESS: ICD-10-CM

## 2025-01-10 LAB
ALBUMIN SERPL BCP-MCNC: 4.1 G/DL (ref 3.2–4.9)
ALBUMIN/GLOB SERPL: 1.3 G/DL
ALP SERPL-CCNC: 116 U/L (ref 30–99)
ALT SERPL-CCNC: 9 U/L (ref 2–50)
ANION GAP SERPL CALC-SCNC: 13 MMOL/L (ref 7–16)
AST SERPL-CCNC: 15 U/L (ref 12–45)
BASOPHILS # BLD AUTO: 0.6 % (ref 0–1.8)
BASOPHILS # BLD: 0.02 K/UL (ref 0–0.12)
BILIRUB SERPL-MCNC: 0.3 MG/DL (ref 0.1–1.5)
BUN SERPL-MCNC: 9 MG/DL (ref 8–22)
CALCIUM ALBUM COR SERPL-MCNC: 8.7 MG/DL (ref 8.5–10.5)
CALCIUM SERPL-MCNC: 8.8 MG/DL (ref 8.4–10.2)
CHLORIDE SERPL-SCNC: 101 MMOL/L (ref 96–112)
CO2 SERPL-SCNC: 22 MMOL/L (ref 20–33)
CREAT SERPL-MCNC: 0.65 MG/DL (ref 0.5–1.4)
EOSINOPHIL # BLD AUTO: 0.01 K/UL (ref 0–0.51)
EOSINOPHIL NFR BLD: 0.3 % (ref 0–6.9)
ERYTHROCYTE [DISTWIDTH] IN BLOOD BY AUTOMATED COUNT: 39.1 FL (ref 35.9–50)
FLUAV RNA SPEC QL NAA+PROBE: NEGATIVE
FLUBV RNA SPEC QL NAA+PROBE: NEGATIVE
GFR SERPLBLD CREATININE-BSD FMLA CKD-EPI: 127 ML/MIN/1.73 M 2
GLOBULIN SER CALC-MCNC: 3.1 G/DL (ref 1.9–3.5)
GLUCOSE SERPL-MCNC: 126 MG/DL (ref 65–99)
HCG SERPL QL: NEGATIVE
HCT VFR BLD AUTO: 39.2 % (ref 37–47)
HGB BLD-MCNC: 12.9 G/DL (ref 12–16)
IMM GRANULOCYTES # BLD AUTO: 0 K/UL (ref 0–0.11)
IMM GRANULOCYTES NFR BLD AUTO: 0 % (ref 0–0.9)
LYMPHOCYTES # BLD AUTO: 0.67 K/UL (ref 1–4.8)
LYMPHOCYTES NFR BLD: 18.7 % (ref 22–41)
MCH RBC QN AUTO: 28.5 PG (ref 27–33)
MCHC RBC AUTO-ENTMCNC: 32.9 G/DL (ref 32.2–35.5)
MCV RBC AUTO: 86.7 FL (ref 81.4–97.8)
MONOCYTES # BLD AUTO: 0.38 K/UL (ref 0–0.85)
MONOCYTES NFR BLD AUTO: 10.6 % (ref 0–13.4)
NEUTROPHILS # BLD AUTO: 2.51 K/UL (ref 1.82–7.42)
NEUTROPHILS NFR BLD: 69.8 % (ref 44–72)
NRBC # BLD AUTO: 0 K/UL
NRBC BLD-RTO: 0 /100 WBC (ref 0–0.2)
PLATELET # BLD AUTO: 285 K/UL (ref 164–446)
PMV BLD AUTO: 9.3 FL (ref 9–12.9)
POTASSIUM SERPL-SCNC: 3.8 MMOL/L (ref 3.6–5.5)
PROT SERPL-MCNC: 7.2 G/DL (ref 6–8.2)
RBC # BLD AUTO: 4.52 M/UL (ref 4.2–5.4)
RSV RNA SPEC QL NAA+PROBE: NEGATIVE
SARS-COV-2 RNA RESP QL NAA+PROBE: NOTDETECTED
SODIUM SERPL-SCNC: 136 MMOL/L (ref 135–145)
SPECIMEN SOURCE: NORMAL
WBC # BLD AUTO: 3.6 K/UL (ref 4.8–10.8)

## 2025-01-10 PROCEDURE — 99283 EMERGENCY DEPT VISIT LOW MDM: CPT

## 2025-01-10 PROCEDURE — 80053 COMPREHEN METABOLIC PANEL: CPT

## 2025-01-10 PROCEDURE — 36415 COLL VENOUS BLD VENIPUNCTURE: CPT

## 2025-01-10 PROCEDURE — 0241U HCHG SARS-COV-2 COVID-19 NFCT DS RESP RNA 4 TRGT MIC: CPT

## 2025-01-10 PROCEDURE — 84703 CHORIONIC GONADOTROPIN ASSAY: CPT

## 2025-01-10 PROCEDURE — 85025 COMPLETE CBC W/AUTO DIFF WBC: CPT

## 2025-01-10 ASSESSMENT — PAIN DESCRIPTION - PAIN TYPE: TYPE: ACUTE PAIN

## 2025-01-10 ASSESSMENT — FIBROSIS 4 INDEX: FIB4 SCORE: 0.33

## 2025-01-10 NOTE — ED TRIAGE NOTES
"/79   Pulse 99   Temp 37 °C (98.6 °F) (Temporal)   Resp 16   Ht 1.626 m (5' 4\")   Wt 85.4 kg (188 lb 3 oz)   LMP 12/26/2024 (Approximate)   SpO2 94%   BMI 32.30 kg/m²   Chief Complaint   Patient presents with    Dizziness     Started yesterday afternoon  on and off since yesterday     Generalized Body Aches     Started yesterday       Other     When stands today she started seeing spots in front of her  denies passing out     Chills     Started yesterday as well   was chilled t/o the night     Comes in w/ brother  COVID swab done and sent to lab   "

## 2025-01-10 NOTE — ED PROVIDER NOTES
ED Provider Note    CHIEF COMPLAINT  Chief Complaint   Patient presents with    Dizziness     Started yesterday afternoon  on and off since yesterday     Generalized Body Aches     Started yesterday       Other     When stands today she started seeing spots in front of her  denies passing out     Chills     Started yesterday as well   was chilled t/o the night       EXTERNAL RECORDS REVIEWED  Outpatient Notes patient had follow-up call from the pharmacist on June 4, 2022 for urinary tract infection positive culture    HPI/ROS  LIMITATION TO HISTORY   Select: : None  OUTSIDE HISTORIAN(S):  Patient's brother is accompanying the patient    Jayda Engel is a 24 y.o. female who presents with multiple complaints, she has had felt chills, body aches, back pain, feeling presyncopal.  She did not have any fever that was measured at home.  She has had nausea with no vomiting, no diarrhea.    PAST MEDICAL HISTORY   has a past medical history of Patient denies medical problems.    SURGICAL HISTORY  patient denies any surgical history    FAMILY HISTORY  No family history on file.    SOCIAL HISTORY  Social History     Tobacco Use    Smoking status: Never    Smokeless tobacco: Never   Substance and Sexual Activity    Alcohol use: Never    Drug use: Never    Sexual activity: Not on file       CURRENT MEDICATIONS  Home Medications       Reviewed by Tara Hameed R.N. (Registered Nurse) on 01/10/25 at 1331  Med List Status: Partial     Medication Last Dose Status   amoxicillin (AMOXIL) 500 MG Cap  Active   miconazole (MONISTAT 7 SIMPLY CURE) 2 % Cream  Active   Prenatal MV & Min w/FA-DHA (PRENATAL GUMMIES PO)  Active                    ALLERGIES  Allergies   Allergen Reactions    Corn-Related Products Unspecified     Patient states allergic per skin test, reaction not verified    Fish Unspecified     Cod - patient states allergic per skin test, reaction not verified       PHYSICAL EXAM  VITAL SIGNS: /79   Pulse 99    "Temp 37 °C (98.6 °F) (Temporal)   Resp 16   Ht 1.626 m (5' 4\")   Wt 85.4 kg (188 lb 3 oz)   LMP 12/26/2024 (Approximate)   SpO2 94%   BMI 32.30 kg/m²      Constitutional: Alert.  HENT: No signs of trauma, Bilateral external ears normal, Nose normal.   Eyes: Pupils are equal and reactive, Conjunctiva normal, Non-icteric.   Neck: Normal range of motion, No tenderness, Supple, No stridor.   Lymphatic: No lymphadenopathy noted.   Cardiovascular: Regular rate and rhythm, no murmurs.   Thorax & Lungs: Normal breath sounds, No respiratory distress, No wheezing, No chest tenderness.   Abdomen: Bowel sounds normal, Soft, No tenderness, No peritoneal signs, No masses, No pulsatile masses.   Skin: Warm, Dry, No erythema, No rash.   Extremities: Intact distal pulses, No edema, No tenderness, No cyanosis.  Musculoskeletal: Good range of motion in all major joints. No tenderness to palpation or major deformities noted.   Neurologic: Alert , Normal motor function, Normal sensory function, No focal deficits noted.   Psychiatric: Affect normal, Judgment normal, Mood normal.       LABS      Labs Reviewed   CBC WITH DIFFERENTIAL - Abnormal; Notable for the following components:       Result Value    WBC 3.6 (*)     Lymphocytes 18.70 (*)     Lymphs (Absolute) 0.67 (*)     All other components within normal limits   COMP METABOLIC PANEL - Abnormal; Notable for the following components:    Glucose 126 (*)     Alkaline Phosphatase 116 (*)     All other components within normal limits   COV-2, FLU A/B, AND RSV BY PCR (CEPHEID)   HCG QUAL SERUM   ESTIMATED GFR             COURSE & MEDICAL DECISION MAKING    ASSESSMENT, COURSE AND PLAN  Care Narrative:     Patient presents with generalized weakness, lightheadedness, dizziness, nausea.  COVID/flu/RSV was ordered, CBC and CMP was ordered.  hCG was ordered.      2:48 PM the patient's white blood count is slightly low indicative of possible viral infection.  CBC otherwise is unremarkable, " CMP unremarkable, pregnancy test negative.  COVID flu RSV test pending.    2:55 PM flu/RSV/COVID is pending.  The patient's leukopenia is likely indicative of a viral infection other than flu COVID RSV.  At this time would like the patient to drink fluids, take Tylenol if needed for muscle aches and return if worse.                  ADDITIONAL PROBLEMS MANAGED  Leukopenia, body aches, weakness, dizziness    DISPOSITION AND DISCUSSIONS  I have discussed management of the patient with the following physicians and JEFFERY's: None    Discussion of management with other HP or appropriate source(s): None     Escalation of care considered, and ultimately not performed:diagnostic imaging and acute inpatient care management, however at this time, the patient is most appropriate for outpatient management    Barriers to care at this time, including but not limited to:  None .     Decision tools and prescription drugs considered including, but not limited to:  Instructions for fluids, Tylenol, Motrin .    The patient will return for new or worsening symptoms and is stable at the time of discharge.    The patient is referred to a primary physician for blood pressure management, diabetic screening, and for all other preventative health concerns.        DISPOSITION:  Patient will be discharged home in stable condition.    FOLLOW UP:  [facility], Emergency Dept  [address]  [place] [id number] [phone]    If symptoms worsen    Amrit Jimenez M.D.  [address]  [address]  [place] [address]  [phone]      As needed      OUTPATIENT MEDICATIONS:  New Prescriptions    No medications on file         FINAL DIAGNOSIS  1. Dizziness    2. Weakness    3. Other decreased white blood cell (WBC) count         Electronically signed by: Karthik Faith M.D., 1/10/2025 2:07 PM

## 2025-04-24 ENCOUNTER — HOSPITAL ENCOUNTER (OUTPATIENT)
Dept: RADIOLOGY | Facility: MEDICAL CENTER | Age: 25
End: 2025-04-24
Attending: NURSE PRACTITIONER
Payer: OTHER MISCELLANEOUS

## 2025-04-24 DIAGNOSIS — S90.32XA CONTUSION OF FOOT INCLUDING TOES, LEFT, INITIAL ENCOUNTER: ICD-10-CM

## 2025-04-24 DIAGNOSIS — S90.122A CONTUSION OF FOOT INCLUDING TOES, LEFT, INITIAL ENCOUNTER: ICD-10-CM

## 2025-04-24 PROCEDURE — 73630 X-RAY EXAM OF FOOT: CPT | Mod: LT
